# Patient Record
Sex: MALE | NOT HISPANIC OR LATINO | Employment: UNEMPLOYED | URBAN - METROPOLITAN AREA
[De-identification: names, ages, dates, MRNs, and addresses within clinical notes are randomized per-mention and may not be internally consistent; named-entity substitution may affect disease eponyms.]

---

## 2022-08-03 NOTE — PROGRESS NOTES
[facility] Cardiology    Encounter Date: 2022    Patient ID: Gracie Gibson is a 37 y.o. male.  : 1985     PCP: Black Robertson MD       Chief Complaint: Chest Pain (consult)      PROBLEM LIST:  1. Chest pain  2. Dyslipidemia  3. Labs 2022: Total cholesterol 292 triglycerides 157 HDL 30   4. Obesity  5. Tobacco abuse  6. Strong family hx premature CAD    History of Present Illness: Gracie Gibson is a 37 y.o. male who presents to the cardiology office today for further evaluation of occasional chest pain and strong family history of coronary disease.  Patient denies any history of hypertension or diabetes.  He has been known to have dyslipidemia and according to his PCP records he had been prescribed Lipitor before however he reports never taking any cholesterol-lowering medications.  His main concern is that a lot of his family members have suffered from heart attacks and heart disease and he is concerned about his own health.  He is currently unemployed but stays active and busy with household chores and taking care of his children.  He reports occasional random bouts of left-sided chest pain which are not triggered by activities or exertion.  He denies any exertional dyspnea.  No palpitations dizziness lightheadedness or syncope.    Past Medical History:   Past Medical History:   Diagnosis Date   • Hyperlipidemia        Past Surgical History: History reviewed. No pertinent surgical history.    Family History:   Family History   Problem Relation Age of Onset   • Heart disease Mother   at the age of 60 had heart disease   • Coronary artery disease Father    • Coronary artery disease Brother   of MI in 40s   • Coronary artery disease Brother   of MI in 40s   • Coronary artery disease Brother  suffered MI at 50.       Social History:   Social History     Socioeconomic History   • Marital status:    Tobacco Use   • Smoking status: Current  "Every Day Smoker   • Smokeless tobacco: Never Used   Vaping Use   • Vaping Use: Never used   Substance and Sexual Activity   • Alcohol use: Not Currently   • Drug use: Not Currently   • Sexual activity: Defer       Tobacco History:   Social History     Tobacco Use   Smoking Status Current Every Day Smoker   Smokeless Tobacco Never Used       Medications:   No current outpatient medications on file.    Allergies:   No Known Allergies  The following portions of the patient's history were reviewed and updated as appropriate: allergies, current medications, past family history, past medical history, past social history, past surgical history and problem list.    Review of Systems   Constitution: Negative for chills, fever, fatigue, generalized weakness.   Cardiovascular: Negative for chest pain, dyspnea on exertion, leg swelling, palpitations, orthopnea, and syncope.   Respiratory: Negative for cough, shortness of breath, and wheezing.  HENT: Negative for ear pain, nosebleeds, and tinnitus.  Gastrointestinal: Negative for abdominal pain, constipation, diarrhea, nausea and vomiting.   Genitourinary: No urinary symptoms.  Musculoskeletal: Negative for muscle cramps.  Neurological: Negative for dizziness, headaches, loss of balance, numbness, and symptoms of stroke.  Psychiatric: Normal mental status.     All other systems reviewed and are negative.        Objective:     /70 (BP Location: Left arm, Patient Position: Sitting)   Pulse 86   Ht 162.6 cm (64\")   Wt 94.3 kg (208 lb)   SpO2 96%   BMI 35.70 kg/m²      Physical Exam  Constitutional: Patient appears well-developed and well-nourished.   HENT: HEENT exam unremarkable.   Neck: Neck supple. No JVD present. No carotid bruits.   Cardiovascular: Normal rate, regular rhythm and normal heart sounds. No murmur heard.   2+ symmetric pulses.   Pulmonary/Chest: Breath sounds normal. Does not exhibit tenderness.   Abdominal: Abdomen benign.   Musculoskeletal: Does not " exhibit edema.   Neurological: Neurological exam unremarkable.   Vitals reviewed.    Data Review:   Labs from PCP office reviewed.  Cholesterol is as mentioned above in problem list.  WBC count 11.1 hemoglobin 17.3 rest of the CBC is normal.  CMP is within normal limits.  A1c 5.3%.  TSH 1.76 magnesium level normal.    ECG 12 Lead    Date/Time: 8/5/2022 5:24 PM  Performed by: Vin Mendez MD  Authorized by: Vin Mendez MD   Comparison: compared with previous ECG   Rhythm: sinus rhythm    Clinical impression: normal ECG               Assessment:      Diagnosis Plan   1. Chronic chest pain with high risk for CAD, his chest pain is atypical however due to strong family stay premature CAD and multiple risk factors we will further screen with coronary calcium score. CT Cardiac Calcium Score Without Dye   2. Dyslipidemia   diet and exercise recommended.  Initiate Crestor 20 mg daily.   3. Family history of premature CAD   had a discussion with the patient about his risk factors and to modify lifestyle for optimal management.   4.        5. Class 2 obesity with alveolar hypoventilation and body mass index (BMI) of 35.0 to 35.9 in adult, unspecified whether serious comorbidity present (HCC)   Cigarette smoking:  diet exercise and weight loss recommended.        Smoking cessation strongly recommended.     Plan:   Patient with atypical chest pain with multiple cardiovascular risk factors including strong family history of premature CAD, ongoing smoking and severe dyslipidemia.  We have recommended coronary calcium score for further screening.  Diet exercise, weight loss and smoking cessation strongly recommended.  Start Crestor 20 mg daily.  FU in 3 MO, sooner as needed.  Thank you for allowing us to participate in the care of your patient.     Vin Mendez MD, FAC, Psychiatric

## 2022-08-05 ENCOUNTER — OFFICE VISIT (OUTPATIENT)
Dept: CARDIOLOGY | Facility: CLINIC | Age: 37
End: 2022-08-05

## 2022-08-05 VITALS
HEART RATE: 86 BPM | BODY MASS INDEX: 35.51 KG/M2 | OXYGEN SATURATION: 96 % | SYSTOLIC BLOOD PRESSURE: 104 MMHG | HEIGHT: 64 IN | DIASTOLIC BLOOD PRESSURE: 70 MMHG | WEIGHT: 208 LBS

## 2022-08-05 DIAGNOSIS — R07.9 CHRONIC CHEST PAIN WITH HIGH RISK FOR CAD: ICD-10-CM

## 2022-08-05 DIAGNOSIS — G89.29 CHRONIC CHEST PAIN WITH HIGH RISK FOR CAD: ICD-10-CM

## 2022-08-05 DIAGNOSIS — Z82.49 FAMILY HISTORY OF PREMATURE CAD: ICD-10-CM

## 2022-08-05 DIAGNOSIS — Z91.89 CHRONIC CHEST PAIN WITH HIGH RISK FOR CAD: ICD-10-CM

## 2022-08-05 DIAGNOSIS — Z79.899 NEW MEDICATION ADDED: ICD-10-CM

## 2022-08-05 DIAGNOSIS — E66.2 CLASS 2 OBESITY WITH ALVEOLAR HYPOVENTILATION AND BODY MASS INDEX (BMI) OF 35.0 TO 35.9 IN ADULT, UNSPECIFIED WHETHER SERIOUS COMORBIDITY PRESENT: ICD-10-CM

## 2022-08-05 DIAGNOSIS — E78.5 DYSLIPIDEMIA: ICD-10-CM

## 2022-08-05 PROBLEM — E66.812 CLASS 2 OBESITY WITH ALVEOLAR HYPOVENTILATION IN ADULT: Status: ACTIVE | Noted: 2022-08-05

## 2022-08-05 PROBLEM — F17.200 SMOKER: Status: ACTIVE | Noted: 2022-08-05

## 2022-08-05 PROCEDURE — 93000 ELECTROCARDIOGRAM COMPLETE: CPT | Performed by: INTERNAL MEDICINE

## 2022-08-05 PROCEDURE — 99203 OFFICE O/P NEW LOW 30 MIN: CPT | Performed by: INTERNAL MEDICINE

## 2022-08-05 RX ORDER — ROSUVASTATIN CALCIUM 20 MG/1
20 TABLET, COATED ORAL NIGHTLY
Qty: 90 TABLET | Refills: 0 | Status: SHIPPED | OUTPATIENT
Start: 2022-08-05

## 2022-08-28 ENCOUNTER — APPOINTMENT (OUTPATIENT)
Dept: CT IMAGING | Facility: HOSPITAL | Age: 37
End: 2022-08-28

## 2022-09-17 ENCOUNTER — HOSPITAL ENCOUNTER (OUTPATIENT)
Dept: CT IMAGING | Facility: HOSPITAL | Age: 37
Discharge: HOME | End: 2022-09-17
Admitting: INTERNAL MEDICINE

## 2022-09-17 DIAGNOSIS — R07.9 CHRONIC CHEST PAIN WITH HIGH RISK FOR CAD: ICD-10-CM

## 2022-09-17 DIAGNOSIS — G89.29 CHRONIC CHEST PAIN WITH HIGH RISK FOR CAD: ICD-10-CM

## 2022-09-17 DIAGNOSIS — Z91.89 CHRONIC CHEST PAIN WITH HIGH RISK FOR CAD: ICD-10-CM

## 2022-09-17 PROCEDURE — 75571 CT HRT W/O DYE W/CA TEST: CPT

## 2022-11-02 ENCOUNTER — OFFICE VISIT (OUTPATIENT)
Dept: CARDIOLOGY | Facility: CLINIC | Age: 37
End: 2022-11-02

## 2022-11-02 VITALS
HEART RATE: 90 BPM | WEIGHT: 206.4 LBS | HEIGHT: 64 IN | DIASTOLIC BLOOD PRESSURE: 80 MMHG | BODY MASS INDEX: 35.24 KG/M2 | OXYGEN SATURATION: 97 % | SYSTOLIC BLOOD PRESSURE: 120 MMHG

## 2022-11-02 DIAGNOSIS — E78.5 DYSLIPIDEMIA: ICD-10-CM

## 2022-11-02 DIAGNOSIS — R07.9 CHRONIC CHEST PAIN WITH HIGH RISK FOR CAD: Primary | ICD-10-CM

## 2022-11-02 DIAGNOSIS — Z72.0 TOBACCO USE: ICD-10-CM

## 2022-11-02 DIAGNOSIS — Z91.89 CHRONIC CHEST PAIN WITH HIGH RISK FOR CAD: Primary | ICD-10-CM

## 2022-11-02 DIAGNOSIS — Z82.49 FAMILY HISTORY OF PREMATURE CAD: ICD-10-CM

## 2022-11-02 DIAGNOSIS — G89.29 CHRONIC CHEST PAIN WITH HIGH RISK FOR CAD: Primary | ICD-10-CM

## 2022-11-02 PROCEDURE — 99214 OFFICE O/P EST MOD 30 MIN: CPT | Performed by: INTERNAL MEDICINE

## 2022-11-02 NOTE — PROGRESS NOTES
[facility] Cardiology    Encounter Date: 2022    Patient ID: Gracie Gibson is a 37 y.o. male.  : 1985     PCP: Black Robertson MD       Chief Complaint: Chronic chest pain with high risk for CAD      PROBLEM LIST:  1. Atypical chest pain  a. Symptoms resolved.  b. CT Cardiac Calcium Score, 2022: score of 6.3. Minimal plaque burden. Low risk of cardiac events in the next 5 years.  2. Dyslipidemia  a. Labs 2022:   HDL 30   3. Obesity  4. Tobacco abuse  5. Strong family hx premature CAD    History of Present Illness  Patient presents today for a 3 month follow-up with a history of atypical chest pain, family history of CAD, and cardiac risk factors. Since last visit, the patient has been doing well overall from a cardiovascular standpoint. He walks everyday for half an hour without experiencing cardiac symptoms. His PCP offered to start PCSK9 inhibitor for familial dyslipidemia and asks if this is needed. He does not have any issues on rosuvastatin. He asks if there is medication to reduce sputum production. He denies history of asthma. He is trying to reduce tobacco use. Patient denies chest pain, shortness of breath, palpitations, edema, dizziness, and syncope.     No Known Allergies      Current Outpatient Medications:   •  rosuvastatin (CRESTOR) 20 MG tablet, Take 1 tablet by mouth Every Night., Disp: 90 tablet, Rfl: 0    The following portions of the patient's history were reviewed and updated as appropriate: allergies, current medications, past family history, past medical history, past social history, past surgical history and problem list.    ROS  Review of Systems   Constitution: Negative for chills, fever, fatigue, generalized weakness.   Cardiovascular: Negative for chest pain, dyspnea on exertion, leg swelling, palpitations, orthopnea, and syncope.   Respiratory: Negative for cough, shortness of breath, and wheezing.  HENT: Negative for  "ear pain, nosebleeds, and tinnitus.  Gastrointestinal: Negative for abdominal pain, constipation, diarrhea, nausea and vomiting.   Genitourinary: No urinary symptoms.  Musculoskeletal: Negative for muscle cramps.  Neurological: Negative for dizziness, headaches, loss of balance, numbness, and symptoms of stroke.  Psychiatric: Normal mental status.     All other systems reviewed and are negative.        Objective:     /80 (BP Location: Right arm, Patient Position: Sitting)   Pulse 90   Ht 162.6 cm (64.02\")   Wt 93.6 kg (206 lb 6.4 oz)   SpO2 97%   BMI 35.41 kg/m²      Physical Exam  Constitutional: Patient appears well-developed and well-nourished.   HENT: HEENT exam unremarkable.   Neck: Neck supple. No JVD present. No carotid bruits.   Cardiovascular: Normal rate, regular rhythm and normal heart sounds. No murmur heard.   2+ symmetric pulses.   Pulmonary/Chest: Breath sounds normal. Does not exhibit tenderness.   Abdominal: Abdomen benign.   Musculoskeletal: Does not exhibit edema.   Neurological: Neurological exam unremarkable.   Vitals reviewed.    Data Review:     Lab date: 7/28/2022  • FLP:   HDL 30   • CBC: WBC 11.1, Hbg17.3, rest of the CBC is normal.    • CMP: WNL    • HbA1c: 5.3%.    • TSH: 1.76   • Mg: Normal.       Procedures       Assessment:      Diagnosis Plan   1.   Atypical chest with high risk for CAD, currently he is free of any chest pain at current level of activities. Pt denies CP but recent CT cardiac calcium score was 6.3. Emphasized importance of risk factor management    2. Family history of premature CAD  Risk factor management    3. Dyslipidemia  PCP monitors cholesterol. If rosuvastatin is not maintaining acceptable levels, agree with initiation of PCSK9 inhibitor    4. Tobacco use  Strongly encouraged cessation      Plan:   Emphasized importance of controlling dyslipidemia due to abnormal CT cardiac calcium score of 6.3 demonstrating risk for progression to " significant CAD blockage if risk factor not managed.  Discussed that if rosuvastatin isn't effectively controlling cholesterol when FLP is repeated, agree with PCP starting PCSK9 inhibitor.  Encouraged heart healthy diet and exercise.   In the absence of current angina or any other cardiac symptoms we do not see the need for further cardiac testing.  Strongly encouraged smoking cessation and counseled patient >3 minutes.   Continue current medications.   FU in 12 MO, sooner as needed.  Thank you for allowing us to participate in the care of your patient.     Scribed for Vin Mendez MD by Sahra Madera. 11/2/2022 14:07 EDT        I, Vin Mendez MD, personally performed the services described in this documentation as scribed by the above named individual in my presence, and it is both accurate and complete.  11/2/2022  16:18 EDT      Part of this note may be an electronic transcription/translation of spoken language to printed text using the Dragon Dictation System.

## 2025-01-16 ENCOUNTER — LAB (OUTPATIENT)
Dept: LAB | Facility: HOSPITAL | Age: 40
End: 2025-01-16
Payer: MEDICAID

## 2025-01-16 PROCEDURE — 36415 COLL VENOUS BLD VENIPUNCTURE: CPT

## 2025-01-16 PROCEDURE — 86905 BLOOD TYPING RBC ANTIGENS: CPT
